# Patient Record
Sex: MALE | Race: WHITE | NOT HISPANIC OR LATINO | Employment: OTHER
[De-identification: names, ages, dates, MRNs, and addresses within clinical notes are randomized per-mention and may not be internally consistent; named-entity substitution may affect disease eponyms.]

---

## 2023-06-01 ENCOUNTER — TRANSCRIBE ORDERS (OUTPATIENT)
Dept: OTHER | Age: 73
End: 2023-06-01

## 2023-06-01 ENCOUNTER — TELEPHONE (OUTPATIENT)
Dept: UROLOGY | Facility: CLINIC | Age: 73
End: 2023-06-01

## 2023-06-01 DIAGNOSIS — R33.9 RETENTION OF URINE, UNSPECIFIED: Primary | ICD-10-CM

## 2023-06-01 NOTE — TELEPHONE ENCOUNTER
[facility] Call Center    Phone Message    May a detailed message be left on voicemail: yes     Reason for Call: Appointment Intake    Referring Provider Name: DIANN CARLISLE  Diagnosis and/or Symptoms: retention    Patient being referred for Urgent Appointment (ASAP) for retention.   Writer sending encounter message per guideline instructions for clinic review and follow-up with patient for scheduling.    Additional Notes: Dr. Perez or Dr. Adriano gregory for HoLep candidacy    Action Taken: Message routed to:  [facility] ([facility] Uro    Travel Screening: Not Applicable

## 2023-06-02 NOTE — TELEPHONE ENCOUNTER
Per chart review, pt had bhatt cath placed through [facility] ED end of April. At that time his ct showed bilateral mod hydronephrosis, and creatinine almost 2. After bhatt placement, creatinine dropped. 3 liters initially drained from bladder followed by 3 more liters.     Note routed to provider for imaging/labs needed prior to holep consult. Pt may need imaging to ensure hydro has resolved- and to approximate size of prostate. 5/9 creatinine was 0.85.     Will call pt for appt request when plan in place.    NIKOLAS Reveles  Care Coordinator  [phone]

## 2023-06-08 ENCOUNTER — PRE VISIT (OUTPATIENT)
Dept: UROLOGY | Facility: CLINIC | Age: 73
End: 2023-06-08

## 2023-06-08 NOTE — TELEPHONE ENCOUNTER
Reason for visit: Follow-Up; discuss HoLEP    Dx/Hx/Sx: Urinary Retention    Records/imaging/labs/orders: In EPIC    At Rooming: paper AUA; collect urine; PVR    Pato Zuniga, EMT  June 8, 2023  8:36 AM

## 2023-06-20 ENCOUNTER — OFFICE VISIT (OUTPATIENT)
Dept: UROLOGY | Facility: CLINIC | Age: 73
End: 2023-06-20
Payer: COMMERCIAL

## 2023-06-20 VITALS
HEIGHT: 72 IN | SYSTOLIC BLOOD PRESSURE: 119 MMHG | WEIGHT: 215 LBS | BODY MASS INDEX: 29.12 KG/M2 | DIASTOLIC BLOOD PRESSURE: 76 MMHG | HEART RATE: 92 BPM

## 2023-06-20 DIAGNOSIS — R33.9 RETENTION OF URINE, UNSPECIFIED: ICD-10-CM

## 2023-06-20 PROCEDURE — 99204 OFFICE O/P NEW MOD 45 MIN: CPT | Performed by: UROLOGY

## 2023-06-20 ASSESSMENT — PAIN SCALES - GENERAL: PAINLEVEL: NO PAIN (0)

## 2023-06-20 NOTE — NURSING NOTE
Chief Complaint   Patient presents with     Consult     Discuss HoLEP;       Blood pressure 119/76, pulse 92, height 1.829 m (6'), weight 97.5 kg (215 lb). Body mass index is 29.16 kg/m .    Patient Active Problem List   Diagnosis     Gout     Obesity     Backache      Peripheral Neuropathy     Plantar fasciitis     Microalbuminuria     Type 2 diabetes, HbA1c goal < 7% (H)     Hyperlipidemia LDL goal <100       Allergies   Allergen Reactions     Penicillins Hives     PN: LW Reaction: HIVES  PN: LW Reaction: HIVES  PN: LW Reaction: HIVES       Morphine      Stomach cramps  Other reaction(s): Stomach Pain, Unknown, Unknown/Not Verified  PN: LW Reaction: bad stomach cramps  PN: LW Reaction: bad stomach cramps       Walnuts [Nuts]      Walnuts, gets canker sores     Wool Fiber Rash       Current Outpatient Medications   Medication Sig Dispense Refill     empagliflozin (JARDIANCE) 25 MG TABS tablet Take 1 tablet by mouth daily       ACTOS 30 MG OR TABS ONE DAILY 3 MONTHS 3     ASPIRIN 81 MG OR TABS 1 tab po QD (Once per day) 100 3     GABAPENTIN 300 MG OR CAPS 1 CAPSULE 3 TIMES DAILY 3 months 3     LANTUS 100 UNIT/ML SC SOLN 26 units at bedtime, then take as directed 6months 1     LISINOPRIL 5 MG PO TABS ONE DAILY 90 Tab 3     metformin (GLUCOPHAGE) 1000 MG tablet take 1 tablet by mouth 2 times daily (with meals). 180 tablet 1     MULTIVITAMIN TABS   OR 1 per day 100 prn     SIMVASTATIN 80 MG OR TABS 1 TABLET EACH EVENING FOR CHOLETEROL 3 months 3     SYRINGE B-D MICRO FINE 1 CC SYRINGES use as directed 100 Each prn     VITAMIN D 1000 UNIT OR TABS 1 TABLET DAILY         Social History     Tobacco Use     Smoking status: Former     Packs/day: 1.50     Years: 33.00     Pack years: 49.50     Types: Cigarettes     Quit date: 3/4/2004     Years since quittin.3     Smokeless tobacco: Never   Substance Use Topics     Alcohol use: No     Drug use: No       Pato Zuniga EMT  2023  3:59 PM

## 2023-06-20 NOTE — PROGRESS NOTES
UROLOGY OUTPATIENT VISIT      Chief Complaint:   Urinary retention      Synopsis    Lopez Campbell is a very pleasant AGE: 72 year old year old person  He is referred for consideration of laser enucleation of the prostate  Symptoms started with hematuria while visiting [place] about 3 months ago  He came to a local emergency room and was noted to have urinary retention  A catheter was placed with return of nearly 3 L  He had bilateral hydronephrosis on imaging  He was previously not aware of any prostate issues had not been on any medications  He does have a history of diabetes but no neuropathy  He has a family history of prostate cancer in his father  He was recently evaluated at our local [facility] with the following, outside records personally reviewed    Review of labs show creatinine elevation to 1.9 at the time of catheter placement.  This down trended to 1.17  White blood cell count was 10.7  Hemoglobin 14.5    Per report cystoscopy was performed showing a very large prostate with obstructing lateral lobes and a large median lobe    Source of bleeding believed to be prostate related             Medications     Current Outpatient Medications   Medication     empagliflozin (JARDIANCE) 25 MG TABS tablet     ACTOS 30 MG OR TABS     ASPIRIN 81 MG OR TABS     GABAPENTIN 300 MG OR CAPS     LANTUS 100 UNIT/ML SC SOLN     LISINOPRIL 5 MG PO TABS     metformin (GLUCOPHAGE) 1000 MG tablet     MULTIVITAMIN TABS   OR     SIMVASTATIN 80 MG OR TABS     SYRINGE B-D MICRO FINE 1 CC SYRINGES     VITAMIN D 1000 UNIT OR TABS     No current facility-administered medications for this visit.     \   A1C 6.5 01/27/2010    A1C 6.5 10/16/2009    A1C 7.1 07/07/2009    A1C 7.1 03/10/2009     \\      Personal Review of Recent Imaging    I personally reviewed the above CT from 4/2023 and based on my own interpretation, severe bladder and upper tract distention.  Prostate is about 60-70 ml, no vincent median lobe            Assessment/Plan   72 year old year old person with urinary retention, large prostate  After discussion of medical and surgical treatments for BPH including alpha blockers, anticholinergics, transurethral resection, laser ablation, enucleation and simple prostatectomy, Mr. Flor decided to proceed with Holmium Laser Enucleation of the Prostate (HoLEP).    We discussed the associated risks of this procedure included but not limited to the following:  -Bleeding, potentially significant enough to require clot evacuation and blood transfusion  -Infection, for which we will plan to treat preoperatively based on targeted antibiotic therapy  -Damage to the bladder, urethra and penis including the risk of urethral stricture and bladder neck contracture  -Risk of incidentally discovered prostate cancer.  We discussed that this would not preclude him from further therapy though it could prolong recovery and potentially increase risk of complications associated with cancer treatment.  Further preoperative workup to assess for prostate cancer prior to surgery was offered but patient deferred.  -Risk of retrograde ejaculation which would be expected to occur in the majority if not all men after HoLEP  -Risk of urinary incontinence.  We discussed that in the majority of men this is a transient process that is generally self limited to the first 6-12 weeks after surgery though could take longer to resolve depending on baseline bladder instability.  -Risk of postperative urinary retention though in published series HoLEP has been found to be associated with high success rates of achieving spontaneous voiding even in men with underactive and atonic bladders.  -We will proceed with preoperative clearance with preference to minimize all anticoagulation as deemed acceptable by his primary care provider.     CC:  No primary care provider on file.

## 2023-06-20 NOTE — LETTER
6/20/2023       RE: Lopez Campbell  [address] [place] [id number]     Dear Colleague,    Thank you for referring your patient, Lopez Campbell, to the [facility] [place] [facility]. Please see a copy of my visit note below.          UROLOGY OUTPATIENT VISIT      Chief Complaint:   Urinary retention      Synopsis    Lopez Campbell is a very pleasant AGE: 72 year old year old person  He is referred for consideration of laser enucleation of the prostate  Symptoms started with hematuria while visiting [place] about 3 months ago  He came to a local emergency room and was noted to have urinary retention  A catheter was placed with return of nearly 3 L  He had bilateral hydronephrosis on imaging  He was previously not aware of any prostate issues had not been on any medications  He does have a history of diabetes but no neuropathy  He has a family history of prostate cancer in his father  He was recently evaluated at our local [facility] with the following, outside records personally reviewed    Review of labs show creatinine elevation to 1.9 at the time of catheter placement.  This down trended to 1.17  White blood cell count was 10.7  Hemoglobin 14.5    Per report cystoscopy was performed showing a very large prostate with obstructing lateral lobes and a large median lobe    Source of bleeding believed to be prostate related             Medications     Current Outpatient Medications   Medication    empagliflozin (JARDIANCE) 25 MG TABS tablet    ACTOS 30 MG OR TABS    ASPIRIN 81 MG OR TABS    GABAPENTIN 300 MG OR CAPS    LANTUS 100 UNIT/ML SC SOLN    LISINOPRIL 5 MG PO TABS    metformin (GLUCOPHAGE) 1000 MG tablet    MULTIVITAMIN TABS   OR    SIMVASTATIN 80 MG OR TABS    SYRINGE B-D MICRO FINE 1 CC SYRINGES    VITAMIN D 1000 UNIT OR TABS     No current facility-administered medications for this visit.     \   A1C 6.5 01/27/2010     A1C 6.5 10/16/2009    A1C 7.1 07/07/2009    A1C 7.1 03/10/2009     \\      Personal Review of Recent Imaging    I personally reviewed the above CT from 4/2023 and based on my own interpretation, severe bladder and upper tract distention.  Prostate is about 60-70 ml, no vincent median lobe           Assessment/Plan   72 year old year old person with urinary retention, large prostate  After discussion of medical and surgical treatments for BPH including alpha blockers, anticholinergics, transurethral resection, laser ablation, enucleation and simple prostatectomy, Mr. Flor decided to proceed with Holmium Laser Enucleation of the Prostate (HoLEP).    We discussed the associated risks of this procedure included but not limited to the following:  -Bleeding, potentially significant enough to require clot evacuation and blood transfusion  -Infection, for which we will plan to treat preoperatively based on targeted antibiotic therapy  -Damage to the bladder, urethra and penis including the risk of urethral stricture and bladder neck contracture  -Risk of incidentally discovered prostate cancer.  We discussed that this would not preclude him from further therapy though it could prolong recovery and potentially increase risk of complications associated with cancer treatment.  Further preoperative workup to assess for prostate cancer prior to surgery was offered but patient deferred.  -Risk of retrograde ejaculation which would be expected to occur in the majority if not all men after HoLEP  -Risk of urinary incontinence.  We discussed that in the majority of men this is a transient process that is generally self limited to the first 6-12 weeks after surgery though could take longer to resolve depending on baseline bladder instability.  -Risk of postperative urinary retention though in published series HoLEP has been found to be associated with high success rates of achieving spontaneous voiding even in men with  underactive and atonic bladders.  -We will proceed with preoperative clearance with preference to minimize all anticoagulation as deemed acceptable by his primary care provider.     CC:  No primary care provider on file.    Sincerely,    Michele Oh MD

## 2023-07-05 ENCOUNTER — TELEPHONE (OUTPATIENT)
Dept: UROLOGY | Facility: CLINIC | Age: 73
End: 2023-07-05

## 2023-07-06 ENCOUNTER — TELEPHONE (OUTPATIENT)
Dept: UROLOGY | Facility: CLINIC | Age: 73
End: 2023-07-06

## 2023-07-06 NOTE — TELEPHONE ENCOUNTER
Spoke with: Patients wife Val      Date of surgery: Monday Sept 18 2023       Location: [facility]       Informed patient they will need a adult : YES      Pre op with provider: Patient will set up at [facility]      H&P Scheduled in PAC- NA      Pre procedure covid :Not required    Additional imaging: NA        Surgery Packet :Routed to HIM to mail out to patient       Additional comments:Please call patient for surgery teaching

## 2023-07-07 ENCOUNTER — TELEPHONE (OUTPATIENT)
Dept: UROLOGY | Facility: CLINIC | Age: 73
End: 2023-07-07

## 2023-07-10 NOTE — TELEPHONE ENCOUNTER
Patients spouse Val calling back in regards to below message. Writer tried back line was unsuccessful. Please contact Val in regards to this message. Thank you

## 2023-09-06 ENCOUNTER — TRANSFERRED RECORDS (OUTPATIENT)
Dept: HEALTH INFORMATION MANAGEMENT | Facility: CLINIC | Age: 73
End: 2023-09-06

## 2023-09-06 ENCOUNTER — PATIENT OUTREACH (OUTPATIENT)
Dept: UROLOGY | Facility: CLINIC | Age: 73
End: 2023-09-06

## 2023-09-06 DIAGNOSIS — N39.0 UTI (URINARY TRACT INFECTION): Primary | ICD-10-CM

## 2023-09-06 LAB
CREATININE (EXTERNAL): 0.7 MG/DL (ref 0.7–1.2)
GLUCOSE (EXTERNAL): 230 MG/DL (ref 70–100)
POTASSIUM (EXTERNAL): 4.4 MMOL/L (ref 3.5–5.1)

## 2023-09-06 NOTE — TELEPHONE ENCOUNTER
[facility]  He will complete a urine sample tomorrow at [facility]    Pre Op Teaching Flowsheet       Pre and Post op Patient Education  Relevant Diagnosis:  bph  Surgical procedure:  holep  Teaching Topic:  Pre and post op teaching  Person Involved in teaching: Yes    Motivation Level:  Asks Questions: Yes  Eager to Learn: Yes  Cooperative: Yes  Receptive (willing/able to accept information):  Yes    Patient demonstrates understanding of the following:  Date of surgery:  9/18  Location of surgery:  [address] [place] [address]  History and Physical and any other testing necessary prior to surgery: Yes  Required time line for completion of History and Physical and any pre-op testing: Yes    Patient demonstrates understanding of the following:  Pre-op bowel prep:  N/A  Pre-op showering/scrub information with PCMX Soap: Yes  Blood thinner medications discussed and when to stop (if applicable):  N/A  Discussed no visitor's at this time due to increase Covid-19 cases and how we need to make sure everyone stays safe.    Infection Prevention:   Patient demonstrates understanding of the following:  Surgical procedure site care taught: Yes  Signs and symptoms of infection taught: Yes      Post-op follow-up:  Discussed how to contact the hospital, nurse, and clinic scheduling staff if necessary. (See packet information)    Instructional materials used/given/mailed:  [facility] Packet, post op teaching sheet, Map, Soap, and with the arrival/location information to come closer to the surgery date.    Surgical instructions packet given to patient in office:  N/A    Follow up: Discussed arranging for someone to drive you home. ( No public transportation)  Someone needed to stay the first twenty hours after surgery: Yes     referral: no     home:  yes    Care Giver:  yes    PCP:  yes

## 2023-09-07 ENCOUNTER — LAB (OUTPATIENT)
Dept: LAB | Facility: CLINIC | Age: 73
End: 2023-09-07
Payer: COMMERCIAL

## 2023-09-07 DIAGNOSIS — N39.0 UTI (URINARY TRACT INFECTION): ICD-10-CM

## 2023-09-07 PROCEDURE — 87086 URINE CULTURE/COLONY COUNT: CPT

## 2023-09-07 PROCEDURE — 87186 SC STD MICRODIL/AGAR DIL: CPT

## 2023-09-07 PROCEDURE — 87088 URINE BACTERIA CULTURE: CPT

## 2023-09-09 LAB
BACTERIA UR CULT: ABNORMAL
BACTERIA UR CULT: ABNORMAL

## 2023-09-12 ENCOUNTER — TELEPHONE (OUTPATIENT)
Dept: UROLOGY | Facility: CLINIC | Age: 73
End: 2023-09-12

## 2023-09-12 DIAGNOSIS — N39.0 UTI (URINARY TRACT INFECTION): Primary | ICD-10-CM

## 2023-09-12 DIAGNOSIS — R33.9 RETENTION OF URINE, UNSPECIFIED: ICD-10-CM

## 2023-09-12 RX ORDER — CIPROFLOXACIN 500 MG/1
500 TABLET, FILM COATED ORAL 2 TIMES DAILY
Qty: 14 TABLET | Refills: 0 | Status: SHIPPED | OUTPATIENT
Start: 2023-09-12

## 2023-09-12 NOTE — TELEPHONE ENCOUNTER
Spoke with patient who will  and will start medication tomorrow, for his upcoming surgery.  Elizabeth Baker RN     120

## 2023-09-18 ENCOUNTER — ANESTHESIA EVENT (OUTPATIENT)
Dept: SURGERY | Facility: CLINIC | Age: 73
End: 2023-09-18
Payer: COMMERCIAL

## 2023-09-18 ENCOUNTER — DOCUMENTATION ONLY (OUTPATIENT)
Dept: OTHER | Facility: CLINIC | Age: 73
End: 2023-09-18

## 2023-09-18 ENCOUNTER — ANESTHESIA (OUTPATIENT)
Dept: SURGERY | Facility: CLINIC | Age: 73
End: 2023-09-18
Payer: COMMERCIAL

## 2023-09-18 ENCOUNTER — HOSPITAL ENCOUNTER (OUTPATIENT)
Facility: CLINIC | Age: 73
Discharge: HOME | End: 2023-09-20
Attending: UROLOGY | Admitting: UROLOGY
Payer: COMMERCIAL

## 2023-09-18 DIAGNOSIS — N40.1 BPH WITH OBSTRUCTION/LOWER URINARY TRACT SYMPTOMS: Primary | ICD-10-CM

## 2023-09-18 DIAGNOSIS — N13.8 BPH WITH OBSTRUCTION/LOWER URINARY TRACT SYMPTOMS: Primary | ICD-10-CM

## 2023-09-18 LAB
ABO/RH(D): NORMAL
ANTIBODY SCREEN: NEGATIVE
GLUCOSE BLDC GLUCOMTR-MCNC: 189 MG/DL (ref 70–99)
GLUCOSE BLDC GLUCOMTR-MCNC: 193 MG/DL (ref 70–99)
GLUCOSE BLDC GLUCOMTR-MCNC: 202 MG/DL (ref 70–99)
GLUCOSE BLDC GLUCOMTR-MCNC: 203 MG/DL (ref 70–99)
GLUCOSE BLDC GLUCOMTR-MCNC: 247 MG/DL (ref 70–99)
HBA1C MFR BLD: 8.1 %
SPECIMEN EXPIRATION DATE: NORMAL

## 2023-09-18 PROCEDURE — 86901 BLOOD TYPING SEROLOGIC RH(D): CPT | Performed by: UROLOGY

## 2023-09-18 PROCEDURE — 88305 TISSUE EXAM BY PATHOLOGIST: CPT | Mod: TC | Performed by: UROLOGY

## 2023-09-18 PROCEDURE — 88305 TISSUE EXAM BY PATHOLOGIST: CPT | Mod: 26 | Performed by: PATHOLOGY

## 2023-09-18 PROCEDURE — 710N000011 HC RECOVERY PHASE 1, LEVEL 3, PER MIN: Performed by: UROLOGY

## 2023-09-18 PROCEDURE — 258N000001 HC RX 258: Performed by: STUDENT IN AN ORGANIZED HEALTH CARE EDUCATION/TRAINING PROGRAM

## 2023-09-18 PROCEDURE — 250N000011 HC RX IP 250 OP 636

## 2023-09-18 PROCEDURE — 250N000013 HC RX MED GY IP 250 OP 250 PS 637: Performed by: STUDENT IN AN ORGANIZED HEALTH CARE EDUCATION/TRAINING PROGRAM

## 2023-09-18 PROCEDURE — 82962 GLUCOSE BLOOD TEST: CPT

## 2023-09-18 PROCEDURE — 250N000025 HC SEVOFLURANE, PER MIN: Performed by: UROLOGY

## 2023-09-18 PROCEDURE — 360N000077 HC SURGERY LEVEL 4, PER MIN: Performed by: UROLOGY

## 2023-09-18 PROCEDURE — C1758 CATHETER, URETERAL: HCPCS | Performed by: UROLOGY

## 2023-09-18 PROCEDURE — 250N000011 HC RX IP 250 OP 636: Performed by: ANESTHESIOLOGY

## 2023-09-18 PROCEDURE — 999N000141 HC STATISTIC PRE-PROCEDURE NURSING ASSESSMENT: Performed by: UROLOGY

## 2023-09-18 PROCEDURE — 86850 RBC ANTIBODY SCREEN: CPT | Performed by: UROLOGY

## 2023-09-18 PROCEDURE — 36415 COLL VENOUS BLD VENIPUNCTURE: CPT | Performed by: UROLOGY

## 2023-09-18 PROCEDURE — 52649 PROSTATE LASER ENUCLEATION: CPT | Mod: GC | Performed by: UROLOGY

## 2023-09-18 PROCEDURE — 250N000013 HC RX MED GY IP 250 OP 250 PS 637

## 2023-09-18 PROCEDURE — 272N000001 HC OR GENERAL SUPPLY STERILE: Performed by: UROLOGY

## 2023-09-18 PROCEDURE — 83036 HEMOGLOBIN GLYCOSYLATED A1C: CPT | Performed by: STUDENT IN AN ORGANIZED HEALTH CARE EDUCATION/TRAINING PROGRAM

## 2023-09-18 PROCEDURE — 250N000012 HC RX MED GY IP 250 OP 636 PS 637: Performed by: STUDENT IN AN ORGANIZED HEALTH CARE EDUCATION/TRAINING PROGRAM

## 2023-09-18 PROCEDURE — 250N000011 HC RX IP 250 OP 636: Mod: JZ | Performed by: UROLOGY

## 2023-09-18 PROCEDURE — 258N000003 HC RX IP 258 OP 636

## 2023-09-18 PROCEDURE — 36415 COLL VENOUS BLD VENIPUNCTURE: CPT | Performed by: STUDENT IN AN ORGANIZED HEALTH CARE EDUCATION/TRAINING PROGRAM

## 2023-09-18 PROCEDURE — 370N000017 HC ANESTHESIA TECHNICAL FEE, PER MIN: Performed by: UROLOGY

## 2023-09-18 PROCEDURE — 250N000009 HC RX 250

## 2023-09-18 RX ORDER — ONDANSETRON 4 MG/1
4 TABLET, ORALLY DISINTEGRATING ORAL EVERY 30 MIN PRN
Status: DISCONTINUED | OUTPATIENT
Start: 2023-09-18 | End: 2023-09-18 | Stop reason: HOSPADM

## 2023-09-18 RX ORDER — SODIUM CHLORIDE, SODIUM LACTATE, POTASSIUM CHLORIDE, CALCIUM CHLORIDE 600; 310; 30; 20 MG/100ML; MG/100ML; MG/100ML; MG/100ML
INJECTION, SOLUTION INTRAVENOUS CONTINUOUS
Status: DISCONTINUED | OUTPATIENT
Start: 2023-09-18 | End: 2023-09-18 | Stop reason: HOSPADM

## 2023-09-18 RX ORDER — SIMVASTATIN 20 MG
80 TABLET ORAL AT BEDTIME
Status: DISCONTINUED | OUTPATIENT
Start: 2023-09-18 | End: 2023-09-20 | Stop reason: HOSPADM

## 2023-09-18 RX ORDER — SODIUM CHLORIDE, SODIUM LACTATE, POTASSIUM CHLORIDE, CALCIUM CHLORIDE 600; 310; 30; 20 MG/100ML; MG/100ML; MG/100ML; MG/100ML
INJECTION, SOLUTION INTRAVENOUS CONTINUOUS PRN
Status: DISCONTINUED | OUTPATIENT
Start: 2023-09-18 | End: 2023-09-18

## 2023-09-18 RX ORDER — ONDANSETRON 2 MG/ML
4 INJECTION INTRAMUSCULAR; INTRAVENOUS EVERY 30 MIN PRN
Status: DISCONTINUED | OUTPATIENT
Start: 2023-09-18 | End: 2023-09-18 | Stop reason: HOSPADM

## 2023-09-18 RX ORDER — HYDROMORPHONE HYDROCHLORIDE 1 MG/ML
0.4 INJECTION, SOLUTION INTRAMUSCULAR; INTRAVENOUS; SUBCUTANEOUS
Status: DISCONTINUED | OUTPATIENT
Start: 2023-09-18 | End: 2023-09-20 | Stop reason: HOSPADM

## 2023-09-18 RX ORDER — HYDROMORPHONE HYDROCHLORIDE 1 MG/ML
0.4 INJECTION, SOLUTION INTRAMUSCULAR; INTRAVENOUS; SUBCUTANEOUS EVERY 5 MIN PRN
Status: DISCONTINUED | OUTPATIENT
Start: 2023-09-18 | End: 2023-09-18 | Stop reason: HOSPADM

## 2023-09-18 RX ORDER — DEXTROSE MONOHYDRATE 25 G/50ML
25-50 INJECTION, SOLUTION INTRAVENOUS
Status: DISCONTINUED | OUTPATIENT
Start: 2023-09-18 | End: 2023-09-20 | Stop reason: HOSPADM

## 2023-09-18 RX ORDER — HYDROMORPHONE HYDROCHLORIDE 1 MG/ML
0.2 INJECTION, SOLUTION INTRAMUSCULAR; INTRAVENOUS; SUBCUTANEOUS
Status: DISCONTINUED | OUTPATIENT
Start: 2023-09-18 | End: 2023-09-20 | Stop reason: HOSPADM

## 2023-09-18 RX ORDER — FENTANYL CITRATE 50 UG/ML
25 INJECTION, SOLUTION INTRAMUSCULAR; INTRAVENOUS EVERY 5 MIN PRN
Status: DISCONTINUED | OUTPATIENT
Start: 2023-09-18 | End: 2023-09-18 | Stop reason: HOSPADM

## 2023-09-18 RX ORDER — SODIUM CHLORIDE, SODIUM LACTATE, POTASSIUM CHLORIDE, CALCIUM CHLORIDE 600; 310; 30; 20 MG/100ML; MG/100ML; MG/100ML; MG/100ML
INJECTION, SOLUTION INTRAVENOUS CONTINUOUS
Status: DISCONTINUED | OUTPATIENT
Start: 2023-09-18 | End: 2023-09-19

## 2023-09-18 RX ORDER — CEFTRIAXONE 1 G/1
1 INJECTION, POWDER, FOR SOLUTION INTRAMUSCULAR; INTRAVENOUS EVERY 24 HOURS
Status: DISCONTINUED | OUTPATIENT
Start: 2023-09-18 | End: 2023-09-20 | Stop reason: HOSPADM

## 2023-09-18 RX ORDER — NALOXONE HYDROCHLORIDE 0.4 MG/ML
0.4 INJECTION, SOLUTION INTRAMUSCULAR; INTRAVENOUS; SUBCUTANEOUS
Status: DISCONTINUED | OUTPATIENT
Start: 2023-09-18 | End: 2023-09-20 | Stop reason: HOSPADM

## 2023-09-18 RX ORDER — OXYCODONE HYDROCHLORIDE 5 MG/1
5 TABLET ORAL
Status: DISCONTINUED | OUTPATIENT
Start: 2023-09-18 | End: 2023-09-18 | Stop reason: HOSPADM

## 2023-09-18 RX ORDER — NICOTINE POLACRILEX 4 MG
15-30 LOZENGE BUCCAL
Status: DISCONTINUED | OUTPATIENT
Start: 2023-09-18 | End: 2023-09-20 | Stop reason: HOSPADM

## 2023-09-18 RX ORDER — FENTANYL CITRATE 50 UG/ML
INJECTION, SOLUTION INTRAMUSCULAR; INTRAVENOUS PRN
Status: DISCONTINUED | OUTPATIENT
Start: 2023-09-18 | End: 2023-09-18

## 2023-09-18 RX ORDER — HYDROMORPHONE HYDROCHLORIDE 1 MG/ML
0.2 INJECTION, SOLUTION INTRAMUSCULAR; INTRAVENOUS; SUBCUTANEOUS EVERY 5 MIN PRN
Status: DISCONTINUED | OUTPATIENT
Start: 2023-09-18 | End: 2023-09-18 | Stop reason: HOSPADM

## 2023-09-18 RX ORDER — ACETAMINOPHEN 325 MG/1
975 TABLET ORAL EVERY 6 HOURS
Status: DISCONTINUED | OUTPATIENT
Start: 2023-09-18 | End: 2023-09-20 | Stop reason: HOSPADM

## 2023-09-18 RX ORDER — POLYETHYLENE GLYCOL 3350 17 G/17G
17 POWDER, FOR SOLUTION ORAL 2 TIMES DAILY PRN
Qty: 510 G | Refills: 0 | Status: SHIPPED | OUTPATIENT
Start: 2023-09-18

## 2023-09-18 RX ORDER — FENTANYL CITRATE 50 UG/ML
50 INJECTION, SOLUTION INTRAMUSCULAR; INTRAVENOUS EVERY 5 MIN PRN
Status: DISCONTINUED | OUTPATIENT
Start: 2023-09-18 | End: 2023-09-18 | Stop reason: HOSPADM

## 2023-09-18 RX ORDER — ALLOPURINOL 100 MG/1
100 TABLET ORAL DAILY
Status: DISCONTINUED | OUTPATIENT
Start: 2023-09-18 | End: 2023-09-20 | Stop reason: HOSPADM

## 2023-09-18 RX ORDER — OXYCODONE HYDROCHLORIDE 10 MG/1
10 TABLET ORAL
Status: DISCONTINUED | OUTPATIENT
Start: 2023-09-18 | End: 2023-09-18 | Stop reason: HOSPADM

## 2023-09-18 RX ORDER — NALOXONE HYDROCHLORIDE 0.4 MG/ML
0.2 INJECTION, SOLUTION INTRAMUSCULAR; INTRAVENOUS; SUBCUTANEOUS
Status: DISCONTINUED | OUTPATIENT
Start: 2023-09-18 | End: 2023-09-20 | Stop reason: HOSPADM

## 2023-09-18 RX ORDER — ALLOPURINOL 100 MG/1
100 TABLET ORAL DAILY
COMMUNITY

## 2023-09-18 RX ORDER — PROPOFOL 10 MG/ML
INJECTION, EMULSION INTRAVENOUS PRN
Status: DISCONTINUED | OUTPATIENT
Start: 2023-09-18 | End: 2023-09-18

## 2023-09-18 RX ORDER — ONDANSETRON 2 MG/ML
INJECTION INTRAMUSCULAR; INTRAVENOUS PRN
Status: DISCONTINUED | OUTPATIENT
Start: 2023-09-18 | End: 2023-09-18

## 2023-09-18 RX ORDER — OXYCODONE HYDROCHLORIDE 10 MG/1
10 TABLET ORAL EVERY 4 HOURS PRN
Status: DISCONTINUED | OUTPATIENT
Start: 2023-09-18 | End: 2023-09-20 | Stop reason: HOSPADM

## 2023-09-18 RX ORDER — LIDOCAINE 40 MG/G
CREAM TOPICAL
Status: DISCONTINUED | OUTPATIENT
Start: 2023-09-18 | End: 2023-09-20 | Stop reason: HOSPADM

## 2023-09-18 RX ORDER — OXYCODONE HYDROCHLORIDE 5 MG/1
5 TABLET ORAL EVERY 4 HOURS PRN
Status: DISCONTINUED | OUTPATIENT
Start: 2023-09-18 | End: 2023-09-20 | Stop reason: HOSPADM

## 2023-09-18 RX ORDER — GABAPENTIN 300 MG/1
300 CAPSULE ORAL 3 TIMES DAILY
Status: DISCONTINUED | OUTPATIENT
Start: 2023-09-18 | End: 2023-09-20 | Stop reason: HOSPADM

## 2023-09-18 RX ORDER — LIDOCAINE HYDROCHLORIDE 20 MG/ML
INJECTION, SOLUTION INFILTRATION; PERINEURAL PRN
Status: DISCONTINUED | OUTPATIENT
Start: 2023-09-18 | End: 2023-09-18

## 2023-09-18 RX ADMIN — Medication 10 MG: at 14:58

## 2023-09-18 RX ADMIN — SODIUM CHLORIDE 6000 ML: 900 IRRIGANT IRRIGATION at 19:53

## 2023-09-18 RX ADMIN — Medication 1 LOZENGE: at 23:03

## 2023-09-18 RX ADMIN — GABAPENTIN 300 MG: 300 CAPSULE ORAL at 20:07

## 2023-09-18 RX ADMIN — PHENYLEPHRINE HYDROCHLORIDE 50 MCG: 10 INJECTION INTRAVENOUS at 14:41

## 2023-09-18 RX ADMIN — SODIUM CHLORIDE, POTASSIUM CHLORIDE, SODIUM LACTATE AND CALCIUM CHLORIDE: 600; 310; 30; 20 INJECTION, SOLUTION INTRAVENOUS at 13:35

## 2023-09-18 RX ADMIN — Medication 20 MG: at 14:30

## 2023-09-18 RX ADMIN — HYDROMORPHONE HYDROCHLORIDE 0.2 MG: 1 INJECTION, SOLUTION INTRAMUSCULAR; INTRAVENOUS; SUBCUTANEOUS at 16:22

## 2023-09-18 RX ADMIN — CEFTRIAXONE 1 G: 1 INJECTION, POWDER, FOR SOLUTION INTRAMUSCULAR; INTRAVENOUS at 13:47

## 2023-09-18 RX ADMIN — FENTANYL CITRATE 50 MCG: 50 INJECTION, SOLUTION INTRAMUSCULAR; INTRAVENOUS at 13:58

## 2023-09-18 RX ADMIN — SODIUM CHLORIDE 3000 ML: 900 IRRIGANT IRRIGATION at 22:26

## 2023-09-18 RX ADMIN — FENTANYL CITRATE 50 MCG: 50 INJECTION INTRAMUSCULAR; INTRAVENOUS at 16:08

## 2023-09-18 RX ADMIN — ONDANSETRON 4 MG: 2 INJECTION INTRAMUSCULAR; INTRAVENOUS at 13:58

## 2023-09-18 RX ADMIN — HYDROMORPHONE HYDROCHLORIDE 0.2 MG: 1 INJECTION, SOLUTION INTRAMUSCULAR; INTRAVENOUS; SUBCUTANEOUS at 16:34

## 2023-09-18 RX ADMIN — HYDROMORPHONE HYDROCHLORIDE 0.5 MG: 1 INJECTION, SOLUTION INTRAMUSCULAR; INTRAVENOUS; SUBCUTANEOUS at 14:08

## 2023-09-18 RX ADMIN — FENTANYL CITRATE 50 MCG: 50 INJECTION, SOLUTION INTRAMUSCULAR; INTRAVENOUS at 13:41

## 2023-09-18 RX ADMIN — INSULIN GLARGINE 13 UNITS: 100 INJECTION, SOLUTION SUBCUTANEOUS at 21:30

## 2023-09-18 RX ADMIN — LIDOCAINE HYDROCHLORIDE 100 MG: 20 INJECTION, SOLUTION INFILTRATION; PERINEURAL at 13:41

## 2023-09-18 RX ADMIN — Medication 50 MG: at 13:41

## 2023-09-18 RX ADMIN — SIMVASTATIN 80 MG: 20 TABLET, FILM COATED ORAL at 21:30

## 2023-09-18 RX ADMIN — ALLOPURINOL 100 MG: 100 TABLET ORAL at 20:07

## 2023-09-18 RX ADMIN — Medication 20 MG: at 14:09

## 2023-09-18 RX ADMIN — SUGAMMADEX 200 MG: 100 INJECTION, SOLUTION INTRAVENOUS at 15:20

## 2023-09-18 RX ADMIN — PHENYLEPHRINE HYDROCHLORIDE 100 MCG: 10 INJECTION INTRAVENOUS at 14:42

## 2023-09-18 RX ADMIN — SODIUM CHLORIDE 3000 ML: 900 IRRIGANT IRRIGATION at 21:29

## 2023-09-18 RX ADMIN — PROPOFOL 150 MG: 10 INJECTION, EMULSION INTRAVENOUS at 13:41

## 2023-09-18 RX ADMIN — FENTANYL CITRATE 25 MCG: 50 INJECTION INTRAMUSCULAR; INTRAVENOUS at 15:52

## 2023-09-18 ASSESSMENT — ACTIVITIES OF DAILY LIVING (ADL)
ADLS_ACUITY_SCORE: 35

## 2023-09-18 ASSESSMENT — LIFESTYLE VARIABLES: TOBACCO_USE: 1

## 2023-09-18 NOTE — ANESTHESIA PROCEDURE NOTES
Airway       Patient location during procedure: OR       Procedure Start/Stop Times: 9/18/2023 1:45 PM  Staff -        Anesthesiologist:  Álvaro Fontana MD       CRNA: Yanni Clveeland APRN CRNA       Performed By: CRNA  Consent for Airway        Urgency: elective  Indications and Patient Condition       Indications for airway management: desiree-procedural       Induction type:intravenous       Mask difficulty assessment: 2 - vent by mask + OA or adjuvant +/- NMBA    Final Airway Details       Final airway type: endotracheal airway       Successful airway: ETT - single and Oral  Endotracheal Airway Details        ETT size (mm): 8.0       Cuffed: yes       Successful intubation technique: direct laryngoscopy       DL Blade Type: Tyler 2       Grade View of Cords: 1       Adjucts: stylet       Position: Right       Measured from: gums/teeth       Secured at (cm): 23       Bite block used: None    Post intubation assessment        Placement verified by: capnometry, equal breath sounds and chest rise        Number of attempts at approach: 1       Number of other approaches attempted: 0       Secured with: silk tape       Ease of procedure: easy       Dentition: Intact and Unchanged    Medication(s) Administered   Medication Administration Time: 9/18/2023 1:45 PM

## 2023-09-18 NOTE — OR NURSING
PACU to Inpatient Nursing Handoff    Patient Lopezedwardo Campbell is a 73 year old male who speaks English.   Procedure Procedure(s):  Holmium Laser Enucleation of the Prostate   Surgeon(s) Primary: Michele Oh MD  Resident - Assisting: Denise Traylor MD     Allergies   Allergen Reactions    Penicillins Hives     PN: LW Reaction: HIVES  PN: LW Reaction: HIVES  PN: LW Reaction: HIVES      Morphine      Stomach cramps  Other reaction(s): Stomach Pain, Unknown, Unknown/Not Verified  PN: LW Reaction: bad stomach cramps  PN: LW Reaction: bad stomach cramps      Walnuts [Nuts]      Walnuts, gets canker sores    Wool Fiber Rash       Isolation  [unfilled]     Past Medical History   has a past medical history of Backache, unspecified (1/06), DIABETES Type II (10/03), Diverticulitis of colon (without mention of hemorrhage)(562.11) (2/04), former smoker, Gout, unspecified, Mixed hyperlipidemia, obesity, Plantar fasciitis, and Shingles (8/08).    Anesthesia Choice   Dermatome Level     Preop Meds Not applicable   Nerve block Not applicable   Intraop Meds dexamethasone (Decadron)  fentanyl (Sublimaze): 100 mcg total  hydromorphone (Dilaudid): 0.5 mg total  ondansetron (Zofran): last given at 1658   Local Meds No   Antibiotics Rocephin 1gm - last given at 1347     Pain Patient Currently in Pain: yes   PACU meds  fentanyl (Sublimaze): 75 mcg (total dose) last given at 1608   hydromorphone (Dilaudid): 0.4 mg (total dose) last given at 1634    PCA / epidural No   Capnography     Telemetry ECG Rhythm: Normal sinus rhythm   Inpatient Telemetry Monitor Ordered? No        Labs Glucose Lab Results   Component Value Date     09/18/2023     07/07/2010       Hgb Lab Results   Component Value Date    HGB 14.5 04/23/2008       INR No results found for: INR   PACU Imaging Not applicable     Wound/Incision     CMS        Equipment CBI   Other LDA       IV Access Peripheral IV 09/18/23 Right;Posterior Hand  (Active)   Site Assessment WDL 09/18/23 1700   Line Status Infusing 09/18/23 1700   Dressing Transparent 09/18/23 1700   Dressing Status intact;dry;clean 09/18/23 1700   Dressing Intervention New dressing  09/18/23 1205   Phlebitis Scale 0-->no symptoms 09/18/23 1700   Infiltration? no 09/18/23 1205   Number of days: 0      Blood Products Not applicable  mL   Intake/Output Date 09/18/23 0700 - 09/19/23 0659   Shift 1057-4318 5755-5965 1181-9941 24 Hour Total   INTAKE   I.V. 400 100  500   Shift Total(mL/kg) 400(3.8) 100(0.95)  500(4.75)   OUTPUT   Urine  -2000  -2000   Blood  100  100   Shift Total(mL/kg)  -1900(-18.06)  -1900(-18.06)   Weight (kg) 105.2 105.2 105.2 105.2      Drains / Bhatt Urethral Catheter 09/18/23 Latex;Silver coated;Triple-lumen 22 fr (Active)   Tube Description UTV 09/18/23 1630   Collection Container Standard;Patent 09/18/23 1700   Securement Method Securing device (Describe) 09/18/23 1700   Number of days: 0      Time of void PreOp Time of Void Prior to Procedure:  (patient has bhatt) (09/18/23 1145)    PostOp      Diapered? No   Bladder Scan     PO    tolerating sips and water     Vitals    B/P: 122/67  T: 97.8  F (36.6  C)    Temp src: Oral  P:  Pulse: 81 (09/18/23 1745)          R: 12  O2:  SpO2: 97 %    O2 Device: Nasal cannula (09/18/23 1645)    Oxygen Delivery: 2 LPM (09/18/23 1645)         Family/support present significant other   Patient belongings     Patient transported on cart and air mat   DC meds/scripts (obs/outpt) Not applicable   Inpatient Pain Meds Released? Yes       Special needs/considerations None   Tasks needing completion None       Belkys Figueroa, NIKOLAS  ASCOM [id number]

## 2023-09-18 NOTE — ANESTHESIA PREPROCEDURE EVALUATION
Anesthesia Pre-Procedure Evaluation    Patient: Lopez Campbell   MRN: [id number] : 1950        Procedure : Procedure(s):  Holmium Laser Enucleation of the Prostate          Past Medical History:   Diagnosis Date     Backache, unspecified     work injury     DIABETES Type II 10/03     Diverticulitis of colon (without mention of hemorrhage)(562.11)     hospitalized     former smoker     quit smoking 2/10/04.     Gout, unspecified      Mixed hyperlipidemia      obesity      Plantar fasciitis      Shingles     left side face      Past Surgical History:   Procedure Laterality Date     SIGMOIDOSCOPY,DIAGNOSTIC      normal     SURGICAL HISTORY OF -       partial removal large intestine for diverticulitis     ZZ COLONOSCOPY THRU STOMA, DIAGNOSTIC      repeat in 5 yrs      Allergies   Allergen Reactions     Penicillins Hives     PN: LW Reaction: HIVES  PN: LW Reaction: HIVES  PN: LW Reaction: HIVES       Morphine      Stomach cramps  Other reaction(s): Stomach Pain, Unknown, Unknown/Not Verified  PN: LW Reaction: bad stomach cramps  PN: LW Reaction: bad stomach cramps       Walnuts [Nuts]      Walnuts, gets canker sores     Wool Fiber Rash      Social History     Tobacco Use     Smoking status: Former     Packs/day: 1.50     Years: 33.00     Pack years: 49.50     Types: Cigarettes     Quit date: 3/4/2004     Years since quittin.5     Smokeless tobacco: Never   Substance Use Topics     Alcohol use: No      Wt Readings from Last 1 Encounters:   23 105.2 kg (231 lb 14.8 oz)        Anesthesia Evaluation   Pt has had prior anesthetic.         ROS/MED HX  ENT/Pulmonary:     (+)                tobacco use, Past use,                      Neurologic:     (+)    peripheral neuropathy,                            Cardiovascular:     (+) Dyslipidemia hypertension- -   -  - -                                      METS/Exercise Tolerance:     Hematologic:       Musculoskeletal:       GI/Hepatic:        Renal/Genitourinary:       Endo:     (+)  type II DM,             Obesity,       Psychiatric/Substance Use:       Infectious Disease:       Malignancy:       Other:          Physical Exam    Airway        Mallampati: II   TM distance: > 3 FB   Neck ROM: full   Mouth opening: > 3 cm    Respiratory Devices and Support         Dental       (+) Minor Abnormalities - some fillings, tiny chips      Cardiovascular   cardiovascular exam normal       Rhythm and rate: regular and normal     Pulmonary   pulmonary exam normal        breath sounds clear to auscultation       OUTSIDE LABS:  CBC:   Lab Results   Component Value Date    WBC 7.4 04/23/2008    WBC 5.7 04/10/2006    HGB 14.5 04/23/2008    HGB 13.6 04/10/2006    HCT 44.6 04/23/2008    HCT 42.3 04/10/2006     04/23/2008     04/10/2006     BMP:   Lab Results   Component Value Date     07/07/2010     05/11/2010    POTASSIUM 4.4 07/07/2010    POTASSIUM 4.5 05/11/2010    CHLORIDE 98 07/07/2010    CHLORIDE 103 05/11/2010    CO2 27 07/07/2010    CO2 33 (H) 05/11/2010    BUN 28 07/07/2010    BUN 10 05/11/2010    CR 0.80 07/07/2010    CR 0.74 05/11/2010     (H) 07/07/2010     (H) 05/11/2010     COAGS: No results found for: PTT, INR, FIBR  POC:   Lab Results   Component Value Date    BGM 92 08/12/2006     HEPATIC:   Lab Results   Component Value Date    ALBUMIN 4.5 01/27/2010    PROTTOTAL 7.8 01/27/2010    ALT 32 01/27/2010    AST 36 01/27/2010    ALKPHOS 64 01/27/2010    BILITOTAL 0.7 01/27/2010     OTHER:   Lab Results   Component Value Date    A1C 6.6 (H) 07/07/2010    CAIO 9.7 07/07/2010    TSH 1.58 03/10/2009       Anesthesia Plan    ASA Status:  3    NPO Status:  NPO Appropriate    Anesthesia Type: General.     - Airway: ETT   Induction: Intravenous.   Maintenance: Balanced.        Consents    Anesthesia Plan(s) and associated risks, benefits, and realistic alternatives discussed. Questions answered and patient/representative(s)  expressed understanding.     - Discussed: Risks, Benefits and Alternatives for BOTH SEDATION and the PROCEDURE were discussed     - Discussed with:  Patient      - Extended Intubation/Ventilatory Support Discussed: No.      - Patient is DNR/DNI Status: No     Use of blood products discussed: Yes.     - Discussed with: Patient.     - Consented: consented to blood products            Reason for refusal: other.     Postoperative Care    Pain management: IV analgesics, Oral pain medications, Multi-modal analgesia.   PONV prophylaxis: Ondansetron (or other 5HT-3), Dexamethasone or Solumedrol     Comments:              Álvaro Fontana MD

## 2023-09-18 NOTE — ANESTHESIA CARE TRANSFER NOTE
Patient: Lopez Campbell    Procedure: Procedure(s):  Holmium Laser Enucleation of the Prostate       Diagnosis: Retention of urine, unspecified [R33.9]  Diagnosis Additional Information: No value filed.    Anesthesia Type:   General     Note:    Oropharynx: oropharynx clear of all foreign objects and spontaneously breathing  Level of Consciousness: awake and drowsy  Oxygen Supplementation: face mask  Level of Supplemental Oxygen (L/min / FiO2): 6  Independent Airway: airway patency satisfactory and stable  Dentition: dentition unchanged  Vital Signs Stable: post-procedure vital signs reviewed and stable  Report to RN Given: handoff report given  Patient transferred to: PACU    Handoff Report: Identifed the Patient, Identified the Reponsible Provider, Reviewed the pertinent medical history, Discussed the surgical course, Reviewed Intra-OP anesthesia mangement and issues during anesthesia, Set expectations for post-procedure period and Allowed opportunity for questions and acknowledgement of understanding      Vitals:  Vitals Value Taken Time   /79 09/18/23 1531   Temp 36.7    Pulse 79 09/18/23 1537   Resp 13 09/18/23 1537   SpO2 95 % 09/18/23 1537   Vitals shown include unvalidated device data.    Electronically Signed By: SANTA Causey CRNA  September 18, 2023  3:38 PM

## 2023-09-18 NOTE — ANESTHESIA POSTPROCEDURE EVALUATION
Patient: Lopez Campbell    Procedure: Procedure(s):  Holmium Laser Enucleation of the Prostate       Anesthesia Type:  General    Note:  Disposition: Inpatient   Postop Pain Control: Uneventful            Sign Out: Well controlled pain   PONV: No   Neuro/Psych: Uneventful            Sign Out: Acceptable/Baseline neuro status   Airway/Respiratory: Uneventful            Sign Out: Acceptable/Baseline resp. status   CV/Hemodynamics: Uneventful            Sign Out: Acceptable CV status; No obvious hypovolemia; No obvious fluid overload   Other NRE:    DID A NON-ROUTINE EVENT OCCUR?            Last vitals:  Vitals Value Taken Time   /77 09/18/23 1800   Temp 36.4  C (97.5  F) 09/18/23 1800   Pulse 83 09/18/23 1800   Resp 10 09/18/23 1800   SpO2 94 % 09/18/23 1800       Electronically Signed By: Peterson Mckinlye MD  September 18, 2023  6:05 PM  
Private Vehicle

## 2023-09-18 NOTE — OP NOTE
Operative Report  9/18/2023    PREOPERATIVE DIAGNOSIS:  Prostatic hypertrophy with urinary retention  POSTOPERATIVE DIAGNOSIS: Same as above    PROCEDURE PERFORMED: Holmium laser enucleation of the prostate  ATTENDING SURGEON: Michele Oh MD  RESIDENT SURGEON: Denise Traylor MD    FINDINGS: Approximately 70 gm prostate with trilobar hypertrophy. Bladder with severe trabeculation  ANESTHESIA: general  INTRAVENOUS FLUIDS: See anesthesia records  ESTIMATED BLOOD LOSS: Less than 100 ml   SPECIMENS: Prostate adenoma  DRAINS: 22-Hungarian 3-way catheter with 60 ml in balloon, on traction    INDICATIONS FOR PROCEDURE: Lopez Campbell is a(n) 73 year old male who was seen in consultation for urinary retention. He has elected treatment with laser enucleation. Prostate volume estimated to be 70 grams. His digital rectal exam was unremarkable.  After discussion of the risks, benefits and alternatives of the procedure, the patient agreed to proceed with the above stated procdure.    DESCRIPTION OF PROCEDURE: After obtaining informed consent, the patient was taken to the operating room and placed under general anesthesia.  He was repositioned in dorsal lithotomy making sure that the legs were positioned and padded safely.  He was then prepped and draped in standard sterile fashion.  Culture directed antibiotics were administered and bilateral sequential compression devices were placed.  A time out was performed confirming the appropriate patient identity and planned procedure.     The procedure was begun by generously lubricating the urethra. The outer sheath was placed over a deflecting obturator and we then looked the scope through the posterior urethra and into the bladder.  The prostate was noted to have a trilobar configuration.  At this point we inserted the 550 micron holmium laser through the 7F laser catheter.    We began enucleation by making an apical incision adjacent to the veromontanum.  We  developed the apical plane on the left side and carried this laterally until 3 oclock.  We then proceeded to make a counter incision in the same fashion on the right side.  We next dissected the prostate out in a circumferential fashion, coming over the top of the gland and entering the bladder neck.  We next identified the mucosal strip anteriorly and transected it with the laser.  We then proceeded to take down the remaining lateral and posterior attachments which allowed us to push the adenoma in an en-bloc fashion into the bladder. Total enucleation time was 54 minutes.    At this point there was a moderate amount of bleeding and approximately 5 minutes were spent identifying bleeding vessels in the fossa and coagulating them with the laser.  Once hemostasis was adequate we switched from the laser resectoscope to the 26F offset telescope with the Piranha morcellation device.  We added an extra inflow to distend the bladder.  The blades were adjusted and set at a rate of 1500 RPM.  We proceeded with morcellation making sure that we morcellated the entirety of the adenoma.  Total morcellation time was 8 minutes.     We then switched back to the laser resectoscope one final time to ensure that no residual tissue was left in the bladder.  We also confirmed that the bladder was unharmed from morcellation and that both ureteral orifices were unharmed during the procedure.  We inspected the fossa and obtained final hemostasis.   We looked the scope out noting that the sphincter was completely intact without evidence of thermal or mechanical injury.     We lubricated the urethra again and passed a 22F 3-way bhatt catheter over a catheter guide.  The urine was noted to be light pink.  We filled the balloon with 60 ml of sterile water and DID place the catheter on traction.  The patient was woken from anesthesia and taken to the recovery room in stable condition.     The specimen was weighed and found to be approximately  40 g.     POSTOPERATIVE PLAN:   -We will monitor the patient post operatively on continuous bladder irrigation for signs of ongling bleeding.  If clear we will consider discharge with bhatt removal as an outpatient.  If continues to have ongoing bleeding concerning for clot formation without bladder irrigation will plan to admit for observation    Michele Oh I, Michele Oh, was present and participatory for the entirety of the procedure

## 2023-09-18 NOTE — OR NURSING
Dr. Fontana notified patient's preop blood glucose was 247. MDA present to bedside verbal no interventions needed at this time plan to monitor in the OR.

## 2023-09-19 LAB
ANION GAP SERPL CALCULATED.3IONS-SCNC: 6 MMOL/L (ref 7–15)
BASOPHILS # BLD AUTO: 0.1 10E3/UL (ref 0–0.2)
BASOPHILS NFR BLD AUTO: 1 %
BUN SERPL-MCNC: 7.3 MG/DL (ref 8–23)
CALCIUM SERPL-MCNC: 8.9 MG/DL (ref 8.8–10.2)
CHLORIDE SERPL-SCNC: 101 MMOL/L (ref 98–107)
CREAT SERPL-MCNC: 0.57 MG/DL (ref 0.67–1.17)
DEPRECATED HCO3 PLAS-SCNC: 30 MMOL/L (ref 22–29)
EGFRCR SERPLBLD CKD-EPI 2021: >90 ML/MIN/1.73M2
EOSINOPHIL # BLD AUTO: 0.1 10E3/UL (ref 0–0.7)
EOSINOPHIL NFR BLD AUTO: 1 %
ERYTHROCYTE [DISTWIDTH] IN BLOOD BY AUTOMATED COUNT: 12.6 % (ref 10–15)
GLUCOSE BLDC GLUCOMTR-MCNC: 173 MG/DL (ref 70–99)
GLUCOSE BLDC GLUCOMTR-MCNC: 189 MG/DL (ref 70–99)
GLUCOSE BLDC GLUCOMTR-MCNC: 199 MG/DL (ref 70–99)
GLUCOSE BLDC GLUCOMTR-MCNC: 209 MG/DL (ref 70–99)
GLUCOSE BLDC GLUCOMTR-MCNC: 215 MG/DL (ref 70–99)
GLUCOSE SERPL-MCNC: 200 MG/DL (ref 70–99)
HCT VFR BLD AUTO: 46.6 % (ref 40–53)
HGB BLD-MCNC: 15.4 G/DL (ref 13.3–17.7)
IMM GRANULOCYTES # BLD: 0 10E3/UL
IMM GRANULOCYTES NFR BLD: 0 %
LYMPHOCYTES # BLD AUTO: 2.2 10E3/UL (ref 0.8–5.3)
LYMPHOCYTES NFR BLD AUTO: 30 %
MCH RBC QN AUTO: 31.5 PG (ref 26.5–33)
MCHC RBC AUTO-ENTMCNC: 33 G/DL (ref 31.5–36.5)
MCV RBC AUTO: 95 FL (ref 78–100)
MONOCYTES # BLD AUTO: 0.6 10E3/UL (ref 0–1.3)
MONOCYTES NFR BLD AUTO: 8 %
NEUTROPHILS # BLD AUTO: 4.4 10E3/UL (ref 1.6–8.3)
NEUTROPHILS NFR BLD AUTO: 60 %
NRBC # BLD AUTO: 0 10E3/UL
NRBC BLD AUTO-RTO: 0 /100
PLATELET # BLD AUTO: 168 10E3/UL (ref 150–450)
POTASSIUM SERPL-SCNC: 4.3 MMOL/L (ref 3.4–5.3)
RBC # BLD AUTO: 4.89 10E6/UL (ref 4.4–5.9)
SODIUM SERPL-SCNC: 137 MMOL/L (ref 136–145)
WBC # BLD AUTO: 7.4 10E3/UL (ref 4–11)

## 2023-09-19 PROCEDURE — 85025 COMPLETE CBC W/AUTO DIFF WBC: CPT | Performed by: STUDENT IN AN ORGANIZED HEALTH CARE EDUCATION/TRAINING PROGRAM

## 2023-09-19 PROCEDURE — 258N000003 HC RX IP 258 OP 636: Performed by: STUDENT IN AN ORGANIZED HEALTH CARE EDUCATION/TRAINING PROGRAM

## 2023-09-19 PROCEDURE — 250N000013 HC RX MED GY IP 250 OP 250 PS 637

## 2023-09-19 PROCEDURE — 250N000013 HC RX MED GY IP 250 OP 250 PS 637: Performed by: STUDENT IN AN ORGANIZED HEALTH CARE EDUCATION/TRAINING PROGRAM

## 2023-09-19 PROCEDURE — 82962 GLUCOSE BLOOD TEST: CPT

## 2023-09-19 PROCEDURE — 250N000012 HC RX MED GY IP 250 OP 636 PS 637: Performed by: STUDENT IN AN ORGANIZED HEALTH CARE EDUCATION/TRAINING PROGRAM

## 2023-09-19 PROCEDURE — 250N000011 HC RX IP 250 OP 636: Mod: JZ | Performed by: STUDENT IN AN ORGANIZED HEALTH CARE EDUCATION/TRAINING PROGRAM

## 2023-09-19 PROCEDURE — 80048 BASIC METABOLIC PNL TOTAL CA: CPT | Performed by: STUDENT IN AN ORGANIZED HEALTH CARE EDUCATION/TRAINING PROGRAM

## 2023-09-19 PROCEDURE — 258N000001 HC RX 258: Performed by: STUDENT IN AN ORGANIZED HEALTH CARE EDUCATION/TRAINING PROGRAM

## 2023-09-19 PROCEDURE — 36415 COLL VENOUS BLD VENIPUNCTURE: CPT | Performed by: STUDENT IN AN ORGANIZED HEALTH CARE EDUCATION/TRAINING PROGRAM

## 2023-09-19 RX ADMIN — ALLOPURINOL 100 MG: 100 TABLET ORAL at 07:54

## 2023-09-19 RX ADMIN — SIMVASTATIN 80 MG: 20 TABLET, FILM COATED ORAL at 21:50

## 2023-09-19 RX ADMIN — SODIUM CHLORIDE, POTASSIUM CHLORIDE, SODIUM LACTATE AND CALCIUM CHLORIDE: 600; 310; 30; 20 INJECTION, SOLUTION INTRAVENOUS at 05:09

## 2023-09-19 RX ADMIN — INSULIN ASPART 2 UNITS: 100 INJECTION, SOLUTION INTRAVENOUS; SUBCUTANEOUS at 12:37

## 2023-09-19 RX ADMIN — GABAPENTIN 300 MG: 300 CAPSULE ORAL at 07:54

## 2023-09-19 RX ADMIN — SODIUM CHLORIDE 3000 ML: 900 IRRIGANT IRRIGATION at 00:04

## 2023-09-19 RX ADMIN — SODIUM CHLORIDE 3000 ML: 900 IRRIGANT IRRIGATION at 02:50

## 2023-09-19 RX ADMIN — INSULIN GLARGINE 13 UNITS: 100 INJECTION, SOLUTION SUBCUTANEOUS at 23:05

## 2023-09-19 RX ADMIN — Medication 1 LOZENGE: at 00:06

## 2023-09-19 RX ADMIN — ACETAMINOPHEN 975 MG: 325 TABLET, FILM COATED ORAL at 21:44

## 2023-09-19 RX ADMIN — INSULIN ASPART 1 UNITS: 100 INJECTION, SOLUTION INTRAVENOUS; SUBCUTANEOUS at 18:34

## 2023-09-19 RX ADMIN — GABAPENTIN 300 MG: 300 CAPSULE ORAL at 14:53

## 2023-09-19 RX ADMIN — INSULIN ASPART 2 UNITS: 100 INJECTION, SOLUTION INTRAVENOUS; SUBCUTANEOUS at 07:54

## 2023-09-19 RX ADMIN — GABAPENTIN 300 MG: 300 CAPSULE ORAL at 19:43

## 2023-09-19 RX ADMIN — CEFTRIAXONE 1 G: 1 INJECTION, POWDER, FOR SOLUTION INTRAMUSCULAR; INTRAVENOUS at 14:53

## 2023-09-19 ASSESSMENT — ACTIVITIES OF DAILY LIVING (ADL)
ADLS_ACUITY_SCORE: 40
ADLS_ACUITY_SCORE: 35
ADLS_ACUITY_SCORE: 40

## 2023-09-19 NOTE — PROGRESS NOTES
"Urology  Progress Note    24 hour events/Subjective:     - No  acute events overnight   - Pain well controlled on current regimen   - Tolerating   diet ; no nausea or vomiting   - Ambulated overnight    Exam  /80 (BP Location: Right arm)   Pulse 67   Temp 96.8  F (36  C) (Oral)   Resp 14   Ht 1.854 m (6' 1\")   Wt 105.2 kg (231 lb 14.8 oz)   SpO2 92%   BMI 30.60 kg/m    No acute distress  Unlabored breathing on NC  Abdomen soft, nontender, nondistended.   Cai with clear urine on mod gtt CBI        Intake/Output Summary (Last 24 hours) at 9/19/2023 0534  Last data filed at 9/19/2023 0512  Gross per 24 hour   Intake 751 ml   Output -2450 ml   Net 3201 ml       UOP CBI    Labs    No results for input(s): WBC, HGB, CR in the last 52832 hours.            Assessment/Plan  73 year old y/o male with PMH of T2DM, diverticulitis, gout,  1 Day Post-Op after HOLEP. Plan for trial of void and PVR x 2 this AM - please page urology team with these values. May require catheter replacement prior to discharge.     Note - plan changes for today are in bold below.    Neuro: tylenol, prn oxy, dilaudid   CV: HD appropriate  Pulm: incentive spirometry while awake  FEN/GI: ADAT, wean mIVF as tolerate  Endo: Relatively euglycemic  : CBI clamped during rounds, irrigated with minimal old clots, fill and pull TOV. He had a catheter in for 5 months previously and was not voiding independently  Heme/ID: HD stable, transfuse prn, watch for s/s of infection  Activity: Up as tolerated  Ambulate at least TID   Ppx: SCDs, ambulation  Home RX on HOLD: jardiance asa lisinopril       Seen and examined with the chief resident. Will discuss with staff surgeon.    Yuval Jeter MD, PGY-2  Urology Resident      PTA medications:   Prior to Admission medications    Medication Sig Start Date End Date Taking? Authorizing Provider   allopurinol (ZYLOPRIM) 100 MG tablet Take 100 mg by mouth daily   Yes Unknown, Entered By History   ASPIRIN 81 MG OR " "TABS 1 tab po QD (Once per day) 1/21/04  Yes Garcia Rajan MD   ciprofloxacin (CIPRO) 500 MG tablet Take 1 tablet (500 mg) by mouth 2 times daily 9/12/23  Yes Michele Oh MD   empagliflozin (JARDIANCE) 25 MG TABS tablet Take 1 tablet by mouth daily 1/25/23  Yes Reported, Patient   GABAPENTIN 300 MG OR CAPS 1 CAPSULE 3 TIMES DAILY  Patient taking differently: Take 600 mg by mouth 2 times daily 7/7/09  Yes Garcia Rajan MD   insulin glargine (LANTUS PEN) 100 UNIT/ML pen Inject 30 Units Subcutaneous every morning   Yes Unknown, Entered By History   LISINOPRIL 5 MG PO TABS ONE DAILY 5/11/10  Yes Garcia Rajan MD   metFORMIN (GLUCOPHAGE) 500 MG tablet Take 1,000 mg by mouth 2 times daily (with meals)   Yes Unknown, Entered By History   MULTIVITAMIN TABS   OR 1 per day 7/20/04  Yes Garcia Rajan MD   polyethylene glycol (MIRALAX) 17 GM/Dose powder Take 17 g by mouth 2 times daily as needed for constipation 9/18/23  Yes Michele Oh MD   SIMVASTATIN 80 MG OR TABS 1 TABLET EACH EVENING FOR CHOLETEROL 10/16/09  Yes Gracia Rajan MD   VITAMIN D 1000 UNIT OR TABS Take 1 tablet by mouth daily 10/16/09  Yes Garcia Rajan MD   SYRINGE B-D MICRO FINE 1 CC SYRINGES use as directed 4/27/10   Garcia Rajan MD          Contacting the urology team: Please see [facility] and page on-call clinician with any questions or concerns regarding this patient. Note writer may be unavailable.     To access [facility] from intranet: under \"Applications\" --> \"Business Applications\" select \"[employer]\" and search \"Urology Adult & Pediatric/[facility].\" Please note that any question about a urology inpatient, [place] or [place], should go to job code [id number].     "

## 2023-09-19 NOTE — PLAN OF CARE
"VS: /65 (BP Location: Right arm)   Pulse 91   Temp 98.1  F (36.7  C) (Oral)   Resp 16   Ht 1.854 m (6' 1\")   Wt 105.2 kg (231 lb 14.8 oz)   SpO2 96%   BMI 30.60 kg/m     O2: >90% on room air. Denies SOB/N/V/chest pain    Output: Voiding spontaneously using urinal. Urine dark red in color, urology aware.    Last BM: 9/18/23   Activity: Up SBA x1. Ambulating in hallway    Skin: Intact    Pain: Denies    CMS: Alert and oriented x4. Denies numbness and tingling    Dressing: NA   Diet: Regular diet. BG before each meal, sliding scale insulin as order    LDA: PIV saline locked    Equipment: IV pole/pump, gait belt, personal belongings, call light within patient reach    Plan: To discharge to home tomorrow    Additional Info:         "

## 2023-09-19 NOTE — PROGRESS NOTES
"Pt arrived to unit at 1835    AOx4  /59 (BP Location: Right arm)   Pulse 83   Temp 97.8  F (36.6  C) (Oral)   Resp 12   Ht 1.854 m (6' 1\")   Wt 105.2 kg (231 lb 14.8 oz)   SpO2 95%   BMI 30.60 kg/m    O2>90% on 1L, per pt rquest, tends to breathe through his mouth, educated on CAPNO     R hand PIV infusing LR at 100ml/hr   Currently declining pain, but reports pressure at lower abdomen, on scheduled tylenol   Skin intact, mepilex dressing on coccyx as precaution   Reg diet, denies N/V. On glucose checks before meals     "

## 2023-09-19 NOTE — PHARMACY-ADMISSION MEDICATION HISTORY
Pharmacist Admission Medication History    Admission medication history is complete. The information provided in this note is only as accurate as the sources available at the time of the update.    Medication reconciliation/reorder completed by provider prior to medication history? Yes    Information Source(s): Patient and CareEverywhere/SureScripts via in-person    Changes made to PTA medication list:  Added: allopurinol  Deleted: None  Changed: lantus 26 units --> 30 units, gabapentin 600 mg TID > BID    Allergies reviewed with patient and updates made in EHR:  RN reviewed    Medication History Completed By: Idalia David RPH 9/18/2023 7:12 PM    Prior to Admission medications    Medication Sig Last Dose Taking? Auth Provider Long Term End Date   allopurinol (ZYLOPRIM) 100 MG tablet Take 100 mg by mouth daily 9/17/2023 Yes Unknown, Entered By History     ASPIRIN 81 MG OR TABS 1 tab po QD (Once per day) Past Week Yes Garcia Rajan MD     ciprofloxacin (CIPRO) 500 MG tablet Take 1 tablet (500 mg) by mouth 2 times daily 9/18/2023 at 0845 Yes Michele Oh MD     empagliflozin (JARDIANCE) 25 MG TABS tablet Take 1 tablet by mouth daily Past Week Yes Reported, Patient     GABAPENTIN 300 MG OR CAPS 1 CAPSULE 3 TIMES DAILY  Patient taking differently: Take 600 mg by mouth 2 times daily 9/17/2023 at 1900 Yes Garcia Rajan MD     insulin glargine (LANTUS PEN) 100 UNIT/ML pen Inject 30 Units Subcutaneous every morning 9/17/2023 at am Yes Unknown, Entered By History     LISINOPRIL 5 MG PO TABS ONE DAILY Past Week Yes Garcia Rajan MD     metFORMIN (GLUCOPHAGE) 500 MG tablet Take 1,000 mg by mouth 2 times daily (with meals) 9/17/2023 at 2000 Yes Unknown, Entered By History     MULTIVITAMIN TABS   OR 1 per day Past Week Yes Garcia Rajan MD     polyethylene glycol (MIRALAX) 17 GM/Dose powder Take 17 g by mouth 2 times daily as needed for constipation  Yes Michele Oh MD      SIMVASTATIN 80 MG OR TABS 1 TABLET EACH EVENING FOR CHOLETEROL 9/17/2023 at 2000 Yes Garcia Rajan MD     VITAMIN D 1000 UNIT OR TABS Take 1 tablet by mouth daily Past Week Yes Garcia Rajan MD     SYRINGE B-D MICRO FINE 1 CC SYRINGES use as directed   Garcia Rajan MD

## 2023-09-19 NOTE — PLAN OF CARE
[id number]      Plan of Care Reviewed With: patient    Overall Patient Progress: improving    Outcome Evaluation: CBI overnight, tolerating well.  Remains on 1LPM, sats in low-mid 90s.  BGs: 193, 209.  Fair PO intake, LR infusing.  Pending void trial.

## 2023-09-20 VITALS
RESPIRATION RATE: 16 BRPM | TEMPERATURE: 96.8 F | WEIGHT: 231.92 LBS | DIASTOLIC BLOOD PRESSURE: 70 MMHG | BODY MASS INDEX: 30.74 KG/M2 | SYSTOLIC BLOOD PRESSURE: 126 MMHG | HEART RATE: 85 BPM | HEIGHT: 73 IN | OXYGEN SATURATION: 96 %

## 2023-09-20 LAB
GLUCOSE BLDC GLUCOMTR-MCNC: 162 MG/DL (ref 70–99)
GLUCOSE BLDC GLUCOMTR-MCNC: 199 MG/DL (ref 70–99)

## 2023-09-20 PROCEDURE — 250N000013 HC RX MED GY IP 250 OP 250 PS 637: Performed by: STUDENT IN AN ORGANIZED HEALTH CARE EDUCATION/TRAINING PROGRAM

## 2023-09-20 PROCEDURE — 82962 GLUCOSE BLOOD TEST: CPT

## 2023-09-20 RX ADMIN — GABAPENTIN 300 MG: 300 CAPSULE ORAL at 08:28

## 2023-09-20 RX ADMIN — INSULIN ASPART 2 UNITS: 100 INJECTION, SOLUTION INTRAVENOUS; SUBCUTANEOUS at 08:28

## 2023-09-20 RX ADMIN — ALLOPURINOL 100 MG: 100 TABLET ORAL at 08:27

## 2023-09-20 ASSESSMENT — ACTIVITIES OF DAILY LIVING (ADL)
ADLS_ACUITY_SCORE: 40

## 2023-09-20 NOTE — PLAN OF CARE
"VS: /66 (BP Location: Right arm)   Pulse 77   Temp 98  F (36.7  C) (Oral)   Resp 16   Ht 1.854 m (6' 1\")   Wt 105.2 kg (231 lb 14.8 oz)   SpO2 95%   BMI 30.60 kg/m      O2: O2 SATS >90% denies chest pain,  or SBO   Output: Voids adequately without issues in the bathroom   Last BM: 9/18/23 BS present all x4 quadrants    Activity:  A-1 stand by assist    Up for meals? N/A   Skin: Intact    Pain: Denies    CMS: AOX4 Denies numbness and tingling   Dressing: None    Diet: Regular diet diabetic with G checks and sliding scale    LDA: R PIV saline locked    Equipment: IV Pole personal belongings ans call light within reach   Plan: Continue to monitor  and update,    Additional Info: To discharge to home today                              "

## 2023-09-20 NOTE — PROGRESS NOTES
"Urology  Progress Note    TERELL GIORDANOVSS  Pain controlled  Tolerating diet   Voiding spontaneously   Ambulating     Exam  /70 (BP Location: Right arm)   Pulse 85   Temp 96.8  F (36  C) (Oral)   Resp 16   Ht 1.854 m (6' 1\")   Wt 105.2 kg (231 lb 14.8 oz)   SpO2 96%   BMI 30.60 kg/m    No acute distress  Unlabored breathing on RA  Abdomen soft, nontender, nondistended.  No Cai in place    UOP 1250/--    Labs  Recent Labs   Lab Test 09/19/23  0605   WBC 7.4   HGB 15.4   CR 0.57*      No AM labs    Assessment/Plan  73 year old y/o male POD#2 s/p HoLEP for BPH with urinary retention. Discharge delayed by ride availability. Plan to discharge today.     Neuro: Tylenol, PRN oxy for pain control  CV: HDS  Pulm: incentive spirometry while awake  FEN/GI: regular diet  Endo: JEANNE  : voiding spontaneously   Heme/ID: home on cipro  Activity: up ad natasha  PPx: SCDs    Seen and examined. Will discuss with Dr. Oh.    Karie Sesay MD, PGY-3  Urology Resident     Contacting the Urology Team     Please use the following job codes to reach the Urology Team. Note that you must use an in house phone and that job codes cannot receive text pages.   On weekdays, dial [phone] (or star-star-star 777 on the new [employer] telephones) then [phone] to reach the Adult Urology resident or PA on call  On weekdays, dial [phone] (or star-star-star 777 on the new [employer] telephones) then [phone] to reach the Pediatric Urology resident  On weeknights and weekends, dial [phone] (or star-star-star 777 on the new [employer] telephones) then [phone] to reach the Urology resident on call (for both Adult and Pediatrics)        "

## 2023-09-20 NOTE — PLAN OF CARE
"Goal Outcome Evaluation:      Plan of Care Reviewed With: patient          Outcome Evaluation: dicharge to home today    VS: /70 (BP Location: Right arm)   Pulse 85   Temp 96.8  F (36  C) (Oral)   Resp 16   Ht 1.854 m (6' 1\")   Wt 105.2 kg (231 lb 14.8 oz)   SpO2 96%   BMI 30.60 kg/m      O2: O2 SATS >90% denies chest pain,  or SOB. Reports using IS   Output: Voids adequately without issues in the bathroom  Depends provided for some dribbling   Last BM: 9/19/23 BS present all x4 quadrants    Activity:  Independent   Up for meals? N/A   Skin: Visible skin intact, pt denies skin issues   Pain: Denies    CMS: AOX4 Denies numbness and tingling   Dressing: None    Diet: Regular diet diabetic.  prior to breakfast, sliding scale insulin given   LDA: R PIV removed    Equipment: Call light in reach       Additional Info: To discharge to home today           "

## 2023-09-20 NOTE — PROGRESS NOTES
DISCHARGE SUMMARY    Pt discharging to: home  Transportation: wheelchair to family   AVS given and discussed: yes  Stoplight Tool given and discussed: NA  Medications given: pt declined filled miralax. Sent back to pharmacy  Belongings returned: yes  Comments: tolerated well. Questions answered by urology

## 2023-09-21 LAB
PATH REPORT.COMMENTS IMP SPEC: NORMAL
PATH REPORT.COMMENTS IMP SPEC: NORMAL
PATH REPORT.FINAL DX SPEC: NORMAL
PATH REPORT.GROSS SPEC: NORMAL
PATH REPORT.RELEVANT HX SPEC: NORMAL
PHOTO IMAGE: NORMAL

## 2023-10-02 ENCOUNTER — TELEPHONE (OUTPATIENT)
Dept: UROLOGY | Facility: CLINIC | Age: 73
End: 2023-10-02

## 2023-10-02 NOTE — TELEPHONE ENCOUNTER
[facility] Call Center    Phone Message    May a detailed message be left on voicemail: yes     Reason for Call: Other: pt calling and had procedure done a couple of weeks ago and is still bleeding a little bit each time he urinates, wondering if this is normal, writer tried [facility] clinic with no response, please advise  no pain    Action Taken: Other: urology    Travel Screening: Not Applicable

## 2023-10-03 NOTE — TELEPHONE ENCOUNTER
Spoke with patient regarding symptoms s/p prostate surgery.  He is still seeing blood occasionally in his urine, but it is getting better.  No pain.  Reassured patient that it can take up to 4 weeks for the blood to dissipate.  It should become less and less until it resolves.  He will call with any other questions, issues.  Elizabeth Baker RN

## 2023-10-19 ENCOUNTER — PRE VISIT (OUTPATIENT)
Dept: UROLOGY | Facility: CLINIC | Age: 73
End: 2023-10-19

## 2023-10-30 ENCOUNTER — TELEPHONE (OUTPATIENT)
Dept: UROLOGY | Facility: CLINIC | Age: 73
End: 2023-10-30

## 2023-10-30 NOTE — TELEPHONE ENCOUNTER
----- Message from Jonelle Hussein CMA sent at 10/30/2023 11:05 AM CDT -----  Erwin Chirinos!    We can add him on to [facility] on 11/7 at 11 AM ([facility] spot- OK per NIKOALS Chirinos)  Thanks for your help!    Kimberly  ----- Message -----  From: Candis Ansari  Sent: 10/30/2023  10:46 AM CDT  To: Jonelle Hussein CMA    This is one of the patients to reschedule to virtual visit 10/31. Patient does not have the option to do a virtual video and I was hesitant to push appointment out for next available given the appointment is a post op.

## 2023-11-01 ENCOUNTER — PRE VISIT (OUTPATIENT)
Dept: UROLOGY | Facility: CLINIC | Age: 73
End: 2023-11-01

## 2023-11-01 NOTE — TELEPHONE ENCOUNTER
Reason for visit:   Post-op     Relevant information:   HoLEP with Dr. Oh 9/18/23  No complications      Records/imaging/labs/orders:   N/a    Pt called: no    At Rooming: ALOK Rosen LPN  11/1/2023  9:23 AM

## 2023-11-07 ENCOUNTER — OFFICE VISIT (OUTPATIENT)
Dept: UROLOGY | Facility: CLINIC | Age: 73
End: 2023-11-07
Payer: COMMERCIAL

## 2023-11-07 VITALS
HEART RATE: 87 BPM | BODY MASS INDEX: 30.62 KG/M2 | HEIGHT: 73 IN | WEIGHT: 231 LBS | OXYGEN SATURATION: 95 % | SYSTOLIC BLOOD PRESSURE: 112 MMHG | DIASTOLIC BLOOD PRESSURE: 72 MMHG | RESPIRATION RATE: 18 BRPM

## 2023-11-07 DIAGNOSIS — R33.9 RETENTION OF URINE, UNSPECIFIED: Primary | ICD-10-CM

## 2023-11-07 PROCEDURE — 51798 US URINE CAPACITY MEASURE: CPT | Performed by: UROLOGY

## 2023-11-07 PROCEDURE — 99024 POSTOP FOLLOW-UP VISIT: CPT | Performed by: UROLOGY

## 2023-11-07 ASSESSMENT — PAIN SCALES - GENERAL: PAINLEVEL: NO PAIN (0)

## 2023-11-07 NOTE — LETTER
11/7/2023       RE: Lopez Campbell  [address] [place] [id number]     Dear Colleague,    Thank you for referring your patient, Lopez ALEJANDRA Campbell, to the [facility] [place] at [facility]. Please see a copy of my visit note below.        HOLEP FOLLOW-UP NOTE    Today I had the pleasure of seeing Mr. Campbell in follow-up for a history of urinary retention    He underwent holmium laser enucleation of the prostate approximately 6 weeks ago     40 gms of tissue were removed.  Pathology was benign    Today he notes that he is going great. No leakage, no hematuria, very pleased.    AUA Symptom Score is 8/35 with a 0/6 for bother    Uroflow/Post Void Residual  PVR 5    Assessment/Plan - 73 year old male 6weeks status post HoLEP  -[facility]    IMichele saw and evaluated this patient and agree with the plan as stated above

## 2023-11-07 NOTE — NURSING NOTE
"Chief Complaint   Patient presents with    Follow Up     My appointment follow up from surgery.        Blood pressure 112/72, pulse 87, resp. rate 18, height 1.854 m (6' 1\"), weight 104.8 kg (231 lb), SpO2 95%. Body mass index is 30.48 kg/m .    Patient Active Problem List   Diagnosis    Gout    Obesity    Backache     Peripheral Neuropathy    Plantar fasciitis    Microalbuminuria    Type 2 diabetes, HbA1c goal < 7% (H)    Hyperlipidemia LDL goal <100    BPH with urinary obstruction       Allergies   Allergen Reactions    Penicillins Hives     PN: LW Reaction: HIVES  PN: LW Reaction: HIVES  PN: LW Reaction: HIVES      Morphine      Stomach cramps  Other reaction(s): Stomach Pain, Unknown, Unknown/Not Verified  PN: LW Reaction: bad stomach cramps  PN: LW Reaction: bad stomach cramps      Walnuts [Nuts]      Walnuts, gets canker sores    Wool Fiber Rash       Current Outpatient Medications   Medication Sig Dispense Refill    allopurinol (ZYLOPRIM) 100 MG tablet Take 100 mg by mouth daily      ASPIRIN 81 MG OR TABS 1 tab po QD (Once per day) 100 3    ciprofloxacin (CIPRO) 500 MG tablet Take 1 tablet (500 mg) by mouth 2 times daily 14 tablet 0    empagliflozin (JARDIANCE) 25 MG TABS tablet Take 1 tablet by mouth daily      GABAPENTIN 300 MG OR CAPS 1 CAPSULE 3 TIMES DAILY (Patient taking differently: Take 600 mg by mouth 2 times daily) 3 months 3    insulin glargine (LANTUS PEN) 100 UNIT/ML pen Inject 30 Units Subcutaneous every morning      LISINOPRIL 5 MG PO TABS ONE DAILY 90 Tab 3    metFORMIN (GLUCOPHAGE) 500 MG tablet Take 1,000 mg by mouth 2 times daily (with meals)      MULTIVITAMIN TABS   OR 1 per day 100 prn    polyethylene glycol (MIRALAX) 17 GM/Dose powder Take 17 g by mouth 2 times daily as needed for constipation 510 g 0    SIMVASTATIN 80 MG OR TABS 1 TABLET EACH EVENING FOR CHOLETEROL 3 months 3    SYRINGE B-D MICRO FINE 1 CC SYRINGES use as directed 100 Each prn    VITAMIN D 1000 UNIT OR TABS Take 1 " tablet by mouth daily         Social History     Tobacco Use    Smoking status: Former     Packs/day: 1.50     Years: 33.00     Additional pack years: 0.00     Total pack years: 49.50     Types: Cigarettes     Quit date: 3/4/2004     Years since quittin.6    Smokeless tobacco: Never   Substance Use Topics    Alcohol use: No    Drug use: No       Laurent Gaines  2023  10:55 AM

## 2023-11-07 NOTE — PROGRESS NOTES
HOLEP FOLLOW-UP NOTE    Today I had the pleasure of seeing Mr. Campbell in follow-up for a history of urinary retention    He underwent holmium laser enucleation of the prostate approximately 6 weeks ago     40 gms of tissue were removed.  Pathology was benign    Today he notes that he is going great. No leakage, no hematuria, very pleased.    AUA Symptom Score is 8/35 with a 0/6 for bother    Uroflow/Post Void Residual  PVR 5    Assessment/Plan - 73 year old male 6weeks status post HoLEP  -[facility], recommend [facility]    I, Michele Oh saw and evaluated this patient and agree with the plan as stated above

## (undated) DEVICE — SOL NACL 0.9% IRRIG 1000ML BOTTLE 2F7124

## (undated) DEVICE — CATH FOLEY 22FR 30ML SILICONE LUBRI-SIL 176822

## (undated) DEVICE — Device

## (undated) DEVICE — SPECIMEN TRAP TISSUE CONTAINER PIRANHA 2208120

## (undated) DEVICE — GLOVE BIOGEL PI MICRO SZ 7.5 48575

## (undated) DEVICE — BAG URINARY DRAIN 4000ML LF 153509

## (undated) DEVICE — SUCTION STRIP WATERBOOM 36" 94030

## (undated) DEVICE — SUCTION MANIFOLD NEPTUNE 2 SYS 4 PORT 0702-020-000

## (undated) DEVICE — SYR PISTON IRRIGATION 60 ML DYND20325

## (undated) DEVICE — DEVICE CATH STABILIZATION STATLOCK FOLEY 3-WAY FOL0105

## (undated) DEVICE — SYR 50ML LL W/O NDL 309653

## (undated) DEVICE — SOL WATER IRRIG 1000ML BOTTLE 2F7114

## (undated) DEVICE — CATH LASER URETERAL 7.1FRX40CM G17797  022403-7.1-40

## (undated) DEVICE — TUBING SET THERMEDX UROLOGY SGL USE LL0006

## (undated) DEVICE — STRAP KNEE/BODY 31143004

## (undated) DEVICE — FILTER PIRANHA DISP 2228.901

## (undated) DEVICE — LASER FIBER HOLMIUM MOSES 550 D/F/L AC-10030120

## (undated) DEVICE — CATH FOLEY 3WAY 22FR 30ML LATEX 0167SI22

## (undated) DEVICE — LINEN GOWN X4 5410

## (undated) DEVICE — TUBING SET PIRANHA 41702208

## (undated) DEVICE — ADAPTER SCOPE UROLOK II LF M0067301400

## (undated) DEVICE — BLADE MORCELLATOR WOLF PIRANHA 4.75X385MM 49700103

## (undated) DEVICE — DRAPE MAYO STAND 23X54 8337

## (undated) DEVICE — LINEN TOWEL PACK X5 5464

## (undated) DEVICE — PAD CHUX UNDERPAD 30X36" P3036C

## (undated) DEVICE — SOL NACL 0.9% IRRIG 3000ML BAG 2B7477

## (undated) DEVICE — TAPE DURAPORE 3" SILK 1538-3

## (undated) RX ORDER — HYDROMORPHONE HYDROCHLORIDE 1 MG/ML
INJECTION, SOLUTION INTRAMUSCULAR; INTRAVENOUS; SUBCUTANEOUS
Status: DISPENSED
Start: 2023-09-18

## (undated) RX ORDER — FENTANYL CITRATE-0.9 % NACL/PF 10 MCG/ML
PLASTIC BAG, INJECTION (ML) INTRAVENOUS
Status: DISPENSED
Start: 2023-09-18

## (undated) RX ORDER — FENTANYL CITRATE 50 UG/ML
INJECTION, SOLUTION INTRAMUSCULAR; INTRAVENOUS
Status: DISPENSED
Start: 2023-09-18

## (undated) RX ORDER — CEFTRIAXONE SODIUM 1 G
VIAL (EA) INJECTION
Status: DISPENSED
Start: 2023-09-18